# Patient Record
Sex: FEMALE | Race: ASIAN | Employment: FULL TIME | URBAN - METROPOLITAN AREA
[De-identification: names, ages, dates, MRNs, and addresses within clinical notes are randomized per-mention and may not be internally consistent; named-entity substitution may affect disease eponyms.]

---

## 2017-02-06 ENCOUNTER — HOSPITAL ENCOUNTER (OUTPATIENT)
Dept: GENERAL RADIOLOGY | Age: 52
Discharge: HOME | End: 2017-02-06

## 2017-02-06 DIAGNOSIS — R06.00 DYSPNEA: ICD-10-CM

## 2017-02-06 PROCEDURE — 71020 XR CHEST PA LAT: CPT

## 2017-08-21 ENCOUNTER — HOSPITAL ENCOUNTER (OUTPATIENT)
Dept: ULTRASOUND IMAGING | Age: 52
Discharge: HOME | End: 2017-08-21
Attending: INTERNAL MEDICINE
Payer: COMMERCIAL

## 2017-08-21 DIAGNOSIS — R10.9 ABDOMINAL PAIN, UNSPECIFIED LOCATION: ICD-10-CM

## 2017-08-21 PROCEDURE — 76856 US EXAM PELVIC COMPLETE: CPT

## 2017-08-21 PROCEDURE — 76830 TRANSVAGINAL US NON-OB: CPT

## 2017-08-21 PROCEDURE — 76700 US EXAM ABDOM COMPLETE: CPT

## 2017-09-27 ENCOUNTER — HOSPITAL ENCOUNTER (OUTPATIENT)
Dept: MRI IMAGING | Age: 52
Discharge: HOME | End: 2017-09-27
Attending: PHYSICAL MEDICINE & REHABILITATION
Payer: COMMERCIAL

## 2017-09-27 DIAGNOSIS — M48.02 CERVICAL SPINAL STENOSIS: ICD-10-CM

## 2017-09-27 DIAGNOSIS — R26.89 BALANCE PROBLEM: ICD-10-CM

## 2017-09-27 PROCEDURE — 72141 MRI NECK SPINE W/O DYE: CPT

## 2018-09-17 ENCOUNTER — HOSPITAL ENCOUNTER (OUTPATIENT)
Dept: MAMMOGRAPHY | Age: 53
Discharge: HOME | End: 2018-09-17
Payer: COMMERCIAL

## 2018-09-17 DIAGNOSIS — Z12.39 SCREENING BREAST EXAMINATION: ICD-10-CM

## 2018-09-17 DIAGNOSIS — Z78.0 POSTMENOPAUSAL: ICD-10-CM

## 2018-09-17 PROCEDURE — 77067 SCR MAMMO BI INCL CAD: CPT

## 2018-09-17 PROCEDURE — 77080 DXA BONE DENSITY AXIAL: CPT

## 2018-12-13 PROBLEM — G31.9 NEURODEGENERATIVE DISORDER (HCC): Status: ACTIVE | Noted: 2018-12-13

## 2019-02-01 PROBLEM — B02.9 HERPES ZOSTER WITHOUT COMPLICATION: Status: ACTIVE | Noted: 2019-02-01

## 2019-02-11 ENCOUNTER — HOSPITAL ENCOUNTER (OUTPATIENT)
Dept: ULTRASOUND IMAGING | Age: 54
Discharge: HOME | End: 2019-02-11
Attending: INTERNAL MEDICINE
Payer: COMMERCIAL

## 2019-02-11 DIAGNOSIS — R10.11 RUQ PAIN: ICD-10-CM

## 2019-02-11 PROCEDURE — 76705 ECHO EXAM OF ABDOMEN: CPT

## 2019-10-15 ENCOUNTER — HOSPITAL ENCOUNTER (OUTPATIENT)
Dept: MRI IMAGING | Age: 54
Discharge: HOME | End: 2019-10-15
Attending: INTERNAL MEDICINE
Payer: COMMERCIAL

## 2019-10-15 DIAGNOSIS — I34.1 NONRHEUMATIC MITRAL VALVE PROLAPSE: ICD-10-CM

## 2019-10-15 PROCEDURE — 75557 CARDIAC MRI FOR MORPH: CPT

## 2019-10-26 ENCOUNTER — HOSPITAL ENCOUNTER (OUTPATIENT)
Dept: GENERAL RADIOLOGY | Age: 54
Discharge: HOME | End: 2019-10-26
Payer: COMMERCIAL

## 2019-10-26 DIAGNOSIS — R29.818 TRANSIENT PARALYSIS OF LIMB: ICD-10-CM

## 2019-10-26 DIAGNOSIS — H57.9 GRITTY EYE: ICD-10-CM

## 2019-10-26 PROCEDURE — 72081 X-RAY EXAM ENTIRE SPI 1 VW: CPT

## 2019-10-26 PROCEDURE — 72050 X-RAY EXAM NECK SPINE 4/5VWS: CPT

## 2019-10-26 PROCEDURE — 72070 X-RAY EXAM THORAC SPINE 2VWS: CPT

## 2020-08-23 ENCOUNTER — HOSPITAL ENCOUNTER (OUTPATIENT)
Dept: GENERAL RADIOLOGY | Age: 55
Discharge: HOME | End: 2020-08-23
Attending: INTERNAL MEDICINE
Payer: COMMERCIAL

## 2020-08-23 ENCOUNTER — HOSPITAL ENCOUNTER (OUTPATIENT)
Dept: GENERAL RADIOLOGY | Age: 55
Discharge: HOME | End: 2020-08-23
Payer: COMMERCIAL

## 2020-08-23 DIAGNOSIS — Z01.89 ENCOUNTER FOR ROUTINE CHEST X-RAY: ICD-10-CM

## 2020-08-23 PROCEDURE — 71046 X-RAY EXAM CHEST 2 VIEWS: CPT

## 2021-08-26 ENCOUNTER — TRANSCRIBE ORDER (OUTPATIENT)
Dept: SCHEDULING | Age: 56
End: 2021-08-26

## 2021-08-26 DIAGNOSIS — M85.80 OTHER SPECIFIED DISORDERS OF BONE DENSITY AND STRUCTURE, UNSPECIFIED SITE: Primary | ICD-10-CM

## 2021-08-26 DIAGNOSIS — Z12.31 ENCOUNTER FOR SCREENING MAMMOGRAM FOR MALIGNANT NEOPLASM OF BREAST: Primary | ICD-10-CM

## 2021-09-14 ENCOUNTER — TRANSCRIBE ORDER (OUTPATIENT)
Dept: SCHEDULING | Age: 56
End: 2021-09-14

## 2021-09-14 DIAGNOSIS — R59.0 LYMPHADENOPATHY, AXILLARY: Primary | ICD-10-CM

## 2021-09-16 ENCOUNTER — HOSPITAL ENCOUNTER (OUTPATIENT)
Dept: ULTRASOUND IMAGING | Age: 56
Discharge: HOME | End: 2021-09-16
Attending: INTERNAL MEDICINE

## 2021-09-16 ENCOUNTER — HOSPITAL ENCOUNTER (OUTPATIENT)
Dept: MAMMOGRAPHY | Age: 56
Discharge: HOME | End: 2021-09-16
Attending: INTERNAL MEDICINE

## 2021-09-16 DIAGNOSIS — R59.0 LYMPHADENOPATHY, AXILLARY: ICD-10-CM

## 2021-09-22 ENCOUNTER — HOSPITAL ENCOUNTER (OUTPATIENT)
Dept: MAMMOGRAPHY | Age: 56
Discharge: HOME | End: 2021-09-22
Attending: INTERNAL MEDICINE
Payer: COMMERCIAL

## 2021-09-22 ENCOUNTER — HOSPITAL ENCOUNTER (OUTPATIENT)
Dept: ULTRASOUND IMAGING | Age: 56
Discharge: HOME | End: 2021-09-22
Attending: INTERNAL MEDICINE
Payer: COMMERCIAL

## 2021-09-22 DIAGNOSIS — R59.0 LYMPHADENOPATHY, AXILLARY: ICD-10-CM

## 2021-09-22 PROCEDURE — 77062 BREAST TOMOSYNTHESIS BI: CPT

## 2021-09-22 PROCEDURE — 76882 US LMTD JT/FCL EVL NVASC XTR: CPT

## 2022-03-18 PROBLEM — B02.9 HERPES ZOSTER WITHOUT COMPLICATION: Status: ACTIVE | Noted: 2019-02-01

## 2022-03-18 PROBLEM — G31.9 NEURODEGENERATIVE DISORDER (HCC): Status: ACTIVE | Noted: 2018-12-13

## 2022-11-03 ENCOUNTER — TRANSCRIBE ORDER (OUTPATIENT)
Dept: GENERAL RADIOLOGY | Age: 57
End: 2022-11-03

## 2022-11-03 DIAGNOSIS — R04.2 HEMOPTYSIS: Primary | ICD-10-CM

## 2022-11-07 ENCOUNTER — HOSPITAL ENCOUNTER (OUTPATIENT)
Dept: GENERAL RADIOLOGY | Age: 57
Discharge: HOME | End: 2022-11-07
Attending: INTERNAL MEDICINE
Payer: COMMERCIAL

## 2022-11-07 ENCOUNTER — TRANSCRIBE ORDER (OUTPATIENT)
Dept: SCHEDULING | Age: 57
End: 2022-11-07

## 2022-11-07 ENCOUNTER — TRANSCRIBE ORDER (OUTPATIENT)
Dept: GENERAL RADIOLOGY | Age: 57
End: 2022-11-07

## 2022-11-07 DIAGNOSIS — R04.2 HEMOPTYSIS: ICD-10-CM

## 2022-11-07 DIAGNOSIS — R04.2 COUGHING UP BLOOD: Primary | ICD-10-CM

## 2022-11-07 DIAGNOSIS — R04.2 HEMOPTYSIS: Primary | ICD-10-CM

## 2022-11-07 PROCEDURE — 71046 X-RAY EXAM CHEST 2 VIEWS: CPT

## 2022-11-08 ENCOUNTER — HOSPITAL ENCOUNTER (OUTPATIENT)
Dept: CT IMAGING | Age: 57
Discharge: HOME | End: 2022-11-08
Attending: INTERNAL MEDICINE
Payer: COMMERCIAL

## 2022-11-08 DIAGNOSIS — R04.2 HEMOPTYSIS: ICD-10-CM

## 2022-11-08 PROCEDURE — 74011000636 HC RX REV CODE- 636: Performed by: INTERNAL MEDICINE

## 2022-11-08 PROCEDURE — 71275 CT ANGIOGRAPHY CHEST: CPT

## 2022-11-08 RX ADMIN — IOPAMIDOL 100 ML: 755 INJECTION, SOLUTION INTRAVENOUS at 14:45

## 2022-11-09 ENCOUNTER — TRANSCRIBE ORDER (OUTPATIENT)
Dept: SCHEDULING | Age: 57
End: 2022-11-09

## 2022-11-09 DIAGNOSIS — R91.8 OTHER NONSPECIFIC ABNORMAL FINDING OF LUNG FIELD: Primary | ICD-10-CM

## 2022-12-02 ENCOUNTER — HOSPITAL ENCOUNTER (OUTPATIENT)
Dept: PET IMAGING | Age: 57
Discharge: HOME | End: 2022-12-02
Attending: INTERNAL MEDICINE
Payer: COMMERCIAL

## 2022-12-02 ENCOUNTER — HOSPITAL ENCOUNTER (OUTPATIENT)
Dept: PET IMAGING | Age: 57
End: 2022-12-02
Attending: INTERNAL MEDICINE
Payer: COMMERCIAL

## 2022-12-02 VITALS — HEIGHT: 60 IN | BODY MASS INDEX: 21.6 KG/M2 | WEIGHT: 110 LBS

## 2022-12-02 DIAGNOSIS — R91.8 OTHER NONSPECIFIC ABNORMAL FINDING OF LUNG FIELD: ICD-10-CM

## 2022-12-02 LAB
GLUCOSE BLD STRIP.AUTO-MCNC: 96 MG/DL (ref 65–117)
SERVICE CMNT-IMP: NORMAL

## 2022-12-02 PROCEDURE — A9552 F18 FDG: HCPCS

## 2022-12-02 RX ORDER — FLUDEOXYGLUCOSE F-18 200 MCI/ML
10 INJECTION INTRAVENOUS ONCE
Status: COMPLETED | OUTPATIENT
Start: 2022-12-02 | End: 2022-12-02

## 2022-12-02 RX ADMIN — FLUDEOXYGLUCOSE F-18 10.4 MILLICURIE: 200 INJECTION INTRAVENOUS at 08:00

## 2023-04-03 ENCOUNTER — TRANSCRIBE ORDER (OUTPATIENT)
Dept: SCHEDULING | Age: 58
End: 2023-04-03

## 2023-04-03 DIAGNOSIS — R93.89 ABNORMAL FINDINGS ON DIAGNOSTIC IMAGING OF OTHER SPECIFIED BODY STRUCTURES: Primary | ICD-10-CM

## 2023-04-22 DIAGNOSIS — R04.2 HEMOPTYSIS: Primary | ICD-10-CM

## 2023-08-17 ENCOUNTER — ANESTHESIA (OUTPATIENT)
Facility: HOSPITAL | Age: 58
End: 2023-08-17

## 2023-08-17 ENCOUNTER — HOSPITAL ENCOUNTER (OUTPATIENT)
Facility: HOSPITAL | Age: 58
Discharge: HOME | End: 2023-08-19
Attending: INTERNAL MEDICINE
Payer: COMMERCIAL

## 2023-08-17 ENCOUNTER — ANESTHESIA EVENT (OUTPATIENT)
Facility: HOSPITAL | Age: 58
End: 2023-08-17

## 2023-08-17 VITALS
HEIGHT: 60 IN | BODY MASS INDEX: 20.6 KG/M2 | SYSTOLIC BLOOD PRESSURE: 115 MMHG | RESPIRATION RATE: 16 BRPM | WEIGHT: 104.94 LBS | HEART RATE: 68 BPM | DIASTOLIC BLOOD PRESSURE: 76 MMHG | OXYGEN SATURATION: 98 %

## 2023-08-17 DIAGNOSIS — I05.2 MITRAL VALVE STENOSIS AND REGURGITATION: ICD-10-CM

## 2023-08-17 LAB
ECHO BSA: 1.42 M2
ECHO MV REGURGITANT ALIASING (NYQUIST) VELOCITY: 46 CM/S
ECHO MV REGURGITANT VELOCITY PISA: 3.5 M/S
ECHO MV REGURGITANT VTIA: 66 CM

## 2023-08-17 PROCEDURE — 2580000003 HC RX 258

## 2023-08-17 PROCEDURE — 93320 DOPPLER ECHO COMPLETE: CPT

## 2023-08-17 PROCEDURE — 6370000000 HC RX 637 (ALT 250 FOR IP): Performed by: INTERNAL MEDICINE

## 2023-08-17 PROCEDURE — 99153 MOD SED SAME PHYS/QHP EA: CPT

## 2023-08-17 PROCEDURE — 99152 MOD SED SAME PHYS/QHP 5/>YRS: CPT

## 2023-08-17 PROCEDURE — 6360000002 HC RX W HCPCS

## 2023-08-17 RX ORDER — LEFLUNOMIDE 10 MG/1
10 TABLET ORAL DAILY
COMMUNITY

## 2023-08-17 RX ORDER — SPIRONOLACTONE 25 MG/1
25 TABLET ORAL DAILY
COMMUNITY

## 2023-08-17 RX ORDER — FEXOFENADINE HCL 180 MG/1
180 TABLET ORAL DAILY
COMMUNITY

## 2023-08-17 RX ORDER — APREMILAST 30 MG/1
TABLET, FILM COATED ORAL 2 TIMES DAILY
COMMUNITY

## 2023-08-17 RX ORDER — LIDOCAINE HYDROCHLORIDE 20 MG/ML
SOLUTION OROPHARYNGEAL PRN
Status: COMPLETED | OUTPATIENT
Start: 2023-08-17 | End: 2023-08-17

## 2023-08-17 RX ORDER — SODIUM CHLORIDE 9 MG/ML
INJECTION, SOLUTION INTRAVENOUS CONTINUOUS PRN
Status: DISCONTINUED | OUTPATIENT
Start: 2023-08-17 | End: 2023-08-17 | Stop reason: SDUPTHER

## 2023-08-17 RX ORDER — FOLIC ACID 1 MG/1
1 TABLET ORAL DAILY
COMMUNITY

## 2023-08-17 RX ADMIN — PROPOFOL 20 MG: 10 INJECTION, EMULSION INTRAVENOUS at 11:28

## 2023-08-17 RX ADMIN — SODIUM CHLORIDE: 9 INJECTION, SOLUTION INTRAVENOUS at 11:18

## 2023-08-17 RX ADMIN — PROPOFOL 80 MG: 10 INJECTION, EMULSION INTRAVENOUS at 11:27

## 2023-08-17 RX ADMIN — LIDOCAINE HYDROCHLORIDE 10 ML: 20 SOLUTION ORAL at 11:14

## 2023-08-17 RX ADMIN — PROPOFOL 100 MG: 10 INJECTION, EMULSION INTRAVENOUS at 11:56

## 2023-08-17 RX ADMIN — BENZOCAINE, BUTAMBEN, AND TETRACAINE HYDROCHLORIDE 1 SPRAY: .028; .004; .004 AEROSOL, SPRAY TOPICAL at 11:15

## 2023-08-17 ASSESSMENT — ENCOUNTER SYMPTOMS: SHORTNESS OF BREATH: 1

## 2023-08-17 NOTE — PROGRESS NOTES
Patient arrived to Non-Invasive Cardiology Lab for Out Patient MELODY Procedure. Staff introduced to patient. Patient identifiers verified with Name and Date of Birth. Procedure verified with patient. Consent forms reviewed and signed by patient or authorized representative and verified. Allergies verified. Patient informed of procedure and plan of care. Questions answered with review. Patient on cardiac monitor, non-invasive blood pressure, SPO2 monitor. On RA. Patient is A&Ox3. Patient reports no complaints. Patient on stretcher, in low position, with side rails up. Patient instructed to call for assistance as needed. Family in waiting room.

## 2023-08-17 NOTE — DISCHARGE INSTRUCTIONS
You had a MELODY today and your throat was numbed. It is normal for your throat to be sore for a day. Do not eat or drink anything today until 1:15p or later. You had anesthesia today. Do not drive today. It is normal to feel tired so get plenty of rest today. Be careful when standing up that you are not light headed before you start walking. Take all medications as directed.

## 2023-08-17 NOTE — ANESTHESIA POSTPROCEDURE EVALUATION
Department of Anesthesiology  Postprocedure Note    Patient:  Kimberly Juárez  MRN: [id number]  YOB: 1965  Date of evaluation: 8/17/2023      Procedure Summary     Date: 08/17/23 Room / Location: [facility] NON_INVASIVE CARDIOLOGY    Anesthesia Start: 1118 Anesthesia Stop: 1155    Procedure: RAD TRANSESOPHAGEAL ECHO Diagnosis:     Scheduled Providers:  Responsible Provider: Yoselin Newell DO    Anesthesia Type: MAC ASA Status: 3          Anesthesia Type: MAC    David Phase I:      David Phase II:        Anesthesia Post Evaluation    Patient location during evaluation: PACU  Patient participation: complete - patient participated  Level of consciousness: awake  Pain score: 0  Airway patency: patent  Nausea & Vomiting: no nausea  Complications: no  Cardiovascular status: hemodynamically stable  Respiratory status: acceptable  Hydration status: euvolemic  Comments: Seen, no complaints   Pain management: adequate

## 2023-08-17 NOTE — PROGRESS NOTES
Patient returned to baseline activity level. Discharge instructions reviewed and all questions and concerns were answered. Saline locked IV removed without complication. Patient wheeled to hospital entrance where her  was waiting to drive home.

## 2024-02-29 ENCOUNTER — HOSPITAL ENCOUNTER (OUTPATIENT)
Age: 59
Discharge: HOME | End: 2024-02-29
Payer: COMMERCIAL

## 2024-02-29 DIAGNOSIS — R05.9 COUGH, UNSPECIFIED TYPE: ICD-10-CM

## 2024-02-29 PROCEDURE — 71046 X-RAY EXAM CHEST 2 VIEWS: CPT

## 2024-03-11 ENCOUNTER — TRANSCRIBE ORDERS (OUTPATIENT)
Facility: HOSPITAL | Age: 59
End: 2024-03-11

## 2024-03-11 DIAGNOSIS — Z95.4 S/P MITRAL VALVE ALLOGRAFT: Primary | ICD-10-CM

## 2024-03-28 ENCOUNTER — HOSPITAL ENCOUNTER (OUTPATIENT)
Facility: HOSPITAL | Age: 59
Setting detail: RECURRING SERIES
Discharge: HOME | End: 2024-03-31
Attending: INTERNAL MEDICINE
Payer: COMMERCIAL

## 2024-03-28 VITALS — HEIGHT: 60 IN | WEIGHT: 107.4 LBS | BODY MASS INDEX: 21.09 KG/M2

## 2024-03-28 PROCEDURE — 93798 PHYS/QHP OP CAR RHAB W/ECG: CPT

## 2024-03-28 PROCEDURE — 93797 PHYS/QHP OP CAR RHAB WO ECG: CPT

## 2024-03-28 ASSESSMENT — EXERCISE STRESS TEST
PEAK_HR: 98
PEAK_RPE: 11
PEAK_METS: 1.8
PEAK_HR: 98
PEAK_BP: 140/90
PEAK_RPE: 11
PEAK_BP: 140/90
PEAK_BP: 140/90

## 2024-03-28 ASSESSMENT — EJECTION FRACTION
EF_VALUE: 60
EF_VALUE: 60

## 2024-03-28 ASSESSMENT — PATIENT HEALTH QUESTIONNAIRE - PHQ9
SUM OF ALL RESPONSES TO PHQ QUESTIONS 1-9: 1
SUM OF ALL RESPONSES TO PHQ QUESTIONS 1-9: 1
3. TROUBLE FALLING OR STAYING ASLEEP: NOT AT ALL
10. IF YOU CHECKED OFF ANY PROBLEMS, HOW DIFFICULT HAVE THESE PROBLEMS MADE IT FOR YOU TO DO YOUR WORK, TAKE CARE OF THINGS AT HOME, OR GET ALONG WITH OTHER PEOPLE: NOT DIFFICULT AT ALL
7. TROUBLE CONCENTRATING ON THINGS, SUCH AS READING THE NEWSPAPER OR WATCHING TELEVISION: NOT AT ALL
SUM OF ALL RESPONSES TO PHQ QUESTIONS 1-9: 1
2. FEELING DOWN, DEPRESSED OR HOPELESS: NOT AT ALL
6. FEELING BAD ABOUT YOURSELF - OR THAT YOU ARE A FAILURE OR HAVE LET YOURSELF OR YOUR FAMILY DOWN: NOT AT ALL
SUM OF ALL RESPONSES TO PHQ QUESTIONS 1-9: 1
SUM OF ALL RESPONSES TO PHQ9 QUESTIONS 1 & 2: 0
5. POOR APPETITE OR OVEREATING: NOT AT ALL
1. LITTLE INTEREST OR PLEASURE IN DOING THINGS: NOT AT ALL
4. FEELING TIRED OR HAVING LITTLE ENERGY: SEVERAL DAYS
9. THOUGHTS THAT YOU WOULD BE BETTER OFF DEAD, OR OF HURTING YOURSELF: NOT AT ALL
8. MOVING OR SPEAKING SO SLOWLY THAT OTHER PEOPLE COULD HAVE NOTICED. OR THE OPPOSITE, BEING SO FIGETY OR RESTLESS THAT YOU HAVE BEEN MOVING AROUND A LOT MORE THAN USUAL: NOT AT ALL

## 2024-03-28 ASSESSMENT — LIFESTYLE VARIABLES: SMOKELESS_TOBACCO: NO

## 2024-03-28 NOTE — CARDIO/PULMONARY
INTAKE APPOINTMENT NOTE  3/28/2024    NAME: Christos Awan : 1965 AGE: 58 y.o.  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]    REFERRING PHYSICIAN: Maikel Juarez MD    MEDICAL HX:  Past Medical History:   Diagnosis Date    Arrhythmia 2012    Eczema 3/16/2012    Hypertension 3/16/2012    Menopause 2015    Pituitary adenoma (HCC) 2012    Viral meningitis 3/16/2012       LABS:     No results found for: \"HBA1C\", \"[id number]\"  Lab Results   Component Value Date/Time    CHOL 220 2020 08:51 AM    HDL 97 2020 08:51 AM         ANTHROPOMETRICS:      Ht Readings from Last 1 Encounters:   24 1.524 m (5')      Wt Readings from Last 1 Encounters:   24 48.7 kg (107 lb 6.4 oz)        WAIST: 27       VISIT SUMMARY:    Christos Awan 58 y.o. 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 EF is 60 % Cardiac risk factors include hypertension, inactivity .      Christos Awan lives with her .  She is the  at the [employer].     Patient was evaluated for depression using the PHQ-9 assessment tool with a result of 1 which is considered mild.     Patient denied chest pain or SOB during 6 minute walk test/ETT and was in SR occ. PACs. Patient walked 0.1 mi at a speed of 1 mph and grade of 0 % for a final MET level of 1.8 mets. Exercise prescription developed using exercise tolerance results and patient stated goals, to be supplemented with home exercise recommendations. Education manual given to patient and reviewed. Patient will attend cardiac rehab 2 times/week which will include both exercise and education sessions.    Intake Start Time: 1400  Intake End Time: 1540    CHRISTOS CHRISTIANSON RN

## 2024-04-02 ENCOUNTER — HOSPITAL ENCOUNTER (OUTPATIENT)
Facility: HOSPITAL | Age: 59
Setting detail: RECURRING SERIES
Discharge: HOME | End: 2024-04-05
Attending: INTERNAL MEDICINE
Payer: COMMERCIAL

## 2024-04-02 VITALS — BODY MASS INDEX: 20.86 KG/M2 | WEIGHT: 106.8 LBS

## 2024-04-02 PROCEDURE — 93798 PHYS/QHP OP CAR RHAB W/ECG: CPT

## 2024-04-02 PROCEDURE — 93797 PHYS/QHP OP CAR RHAB WO ECG: CPT

## 2024-04-02 ASSESSMENT — EXERCISE STRESS TEST
PEAK_METS: 1.8
PEAK_METS: 1.8

## 2024-04-02 NOTE — CARDIO/PULMONARY
plant-based).    Briefly reviewed the nutrition information in the Cardiac Rehab patient education book and encouraged patient to read thoroughly, ask questions as needed, and use for future reference for heart healthy nutrition information.    Supplemental handouts provided: None today    Aracelis Awan is not scheduled to participate in Cardiac Rehab group nutrition classes.          PATIENT GOALS:    Weight Goals:Maintain    Nutrition Goals:  Daily Recommendations:  Calories: 1300 /day  (for weight maintenance)  Saturated Fat: no more than 8 g/day  Trans Fat: 0 g/day  Sodium: no more than 2300 mg/day  Fruit: 1 cups / day  Vegetables: 2 cups/day    Other:  - read and compare food labels  - Fill 1/2 of your plate with nonstarchy vegetables daily at dinner this month.      Keeping a food diary was recommended.            Questions addressed. Follow-up plans discussed. Patient verbalized understanding.    Claudia Thornton RD, [facility]– [place]

## 2024-04-04 ENCOUNTER — HOSPITAL ENCOUNTER (OUTPATIENT)
Facility: HOSPITAL | Age: 59
Setting detail: RECURRING SERIES
Discharge: HOME | End: 2024-04-07
Attending: INTERNAL MEDICINE
Payer: COMMERCIAL

## 2024-04-04 VITALS — BODY MASS INDEX: 21.09 KG/M2 | WEIGHT: 108 LBS

## 2024-04-04 PROCEDURE — 93798 PHYS/QHP OP CAR RHAB W/ECG: CPT

## 2024-04-04 ASSESSMENT — EXERCISE STRESS TEST
PEAK_RPE: 11
PEAK_METS: 2.6

## 2024-04-09 ENCOUNTER — HOSPITAL ENCOUNTER (OUTPATIENT)
Facility: HOSPITAL | Age: 59
Setting detail: RECURRING SERIES
Discharge: HOME | End: 2024-04-12
Attending: INTERNAL MEDICINE
Payer: COMMERCIAL

## 2024-04-09 VITALS — WEIGHT: 106.4 LBS | BODY MASS INDEX: 20.78 KG/M2

## 2024-04-09 PROCEDURE — 93798 PHYS/QHP OP CAR RHAB W/ECG: CPT

## 2024-04-09 ASSESSMENT — EXERCISE STRESS TEST
PEAK_METS: 2.6
PEAK_RPE: 12

## 2024-04-11 ENCOUNTER — HOSPITAL ENCOUNTER (OUTPATIENT)
Facility: HOSPITAL | Age: 59
Setting detail: RECURRING SERIES
Discharge: HOME | End: 2024-04-14
Attending: INTERNAL MEDICINE
Payer: COMMERCIAL

## 2024-04-11 VITALS — WEIGHT: 106.4 LBS | BODY MASS INDEX: 20.78 KG/M2

## 2024-04-11 PROCEDURE — 93798 PHYS/QHP OP CAR RHAB W/ECG: CPT

## 2024-04-11 ASSESSMENT — EXERCISE STRESS TEST
PEAK_RPE: 12
PEAK_METS: 2.8

## 2024-04-16 ENCOUNTER — TRANSCRIBE ORDERS (OUTPATIENT)
Age: 59
End: 2024-04-16

## 2024-04-16 ENCOUNTER — HOSPITAL ENCOUNTER (OUTPATIENT)
Facility: HOSPITAL | Age: 59
Setting detail: RECURRING SERIES
Discharge: HOME | End: 2024-04-19
Attending: INTERNAL MEDICINE
Payer: COMMERCIAL

## 2024-04-16 ENCOUNTER — HOSPITAL ENCOUNTER (OUTPATIENT)
Age: 59
Discharge: HOME | End: 2024-04-19
Payer: COMMERCIAL

## 2024-04-16 VITALS — WEIGHT: 107.6 LBS | BODY MASS INDEX: 21.01 KG/M2

## 2024-04-16 DIAGNOSIS — J93.9 PNEUMOTHORAX ON RIGHT: ICD-10-CM

## 2024-04-16 DIAGNOSIS — J93.83 OTHER PNEUMOTHORAX: ICD-10-CM

## 2024-04-16 DIAGNOSIS — J93.83 OTHER PNEUMOTHORAX: Primary | ICD-10-CM

## 2024-04-16 PROCEDURE — 93798 PHYS/QHP OP CAR RHAB W/ECG: CPT

## 2024-04-16 PROCEDURE — 71046 X-RAY EXAM CHEST 2 VIEWS: CPT

## 2024-04-16 ASSESSMENT — EXERCISE STRESS TEST
PEAK_RPE: 12
PEAK_METS: 2.8

## 2024-04-18 ENCOUNTER — HOSPITAL ENCOUNTER (OUTPATIENT)
Facility: HOSPITAL | Age: 59
Setting detail: RECURRING SERIES
Discharge: HOME | End: 2024-04-21
Attending: INTERNAL MEDICINE
Payer: COMMERCIAL

## 2024-04-18 VITALS — BODY MASS INDEX: 21.01 KG/M2 | WEIGHT: 107.6 LBS

## 2024-04-18 PROCEDURE — 93798 PHYS/QHP OP CAR RHAB W/ECG: CPT

## 2024-04-18 RX ORDER — FUROSEMIDE 20 MG/1
20 TABLET ORAL DAILY
COMMUNITY

## 2024-04-18 ASSESSMENT — LIFESTYLE VARIABLES: SMOKELESS_TOBACCO: NO

## 2024-04-18 ASSESSMENT — EJECTION FRACTION: EF_VALUE: 60

## 2024-04-18 ASSESSMENT — EXERCISE STRESS TEST
PEAK_RPE: 12
PEAK_METS: 2.9

## 2024-04-23 ENCOUNTER — HOSPITAL ENCOUNTER (OUTPATIENT)
Facility: HOSPITAL | Age: 59
Setting detail: RECURRING SERIES
Discharge: HOME | End: 2024-04-26
Attending: INTERNAL MEDICINE
Payer: COMMERCIAL

## 2024-04-23 VITALS — BODY MASS INDEX: 21.09 KG/M2 | WEIGHT: 108 LBS

## 2024-04-23 PROCEDURE — 93798 PHYS/QHP OP CAR RHAB W/ECG: CPT

## 2024-04-23 ASSESSMENT — EXERCISE STRESS TEST
PEAK_METS: 2.9
PEAK_RPE: 12

## 2024-04-25 ENCOUNTER — HOSPITAL ENCOUNTER (OUTPATIENT)
Facility: HOSPITAL | Age: 59
Setting detail: RECURRING SERIES
Discharge: HOME | End: 2024-04-28
Attending: INTERNAL MEDICINE
Payer: COMMERCIAL

## 2024-04-25 VITALS — BODY MASS INDEX: 20.9 KG/M2 | WEIGHT: 107 LBS

## 2024-04-25 PROCEDURE — 93798 PHYS/QHP OP CAR RHAB W/ECG: CPT

## 2024-04-25 ASSESSMENT — EXERCISE STRESS TEST
PEAK_METS: 3
PEAK_RPE: 12

## 2024-04-30 ENCOUNTER — HOSPITAL ENCOUNTER (OUTPATIENT)
Facility: HOSPITAL | Age: 59
Setting detail: RECURRING SERIES
Discharge: HOME | End: 2024-05-03
Attending: INTERNAL MEDICINE
Payer: COMMERCIAL

## 2024-04-30 VITALS — WEIGHT: 108 LBS | BODY MASS INDEX: 21.09 KG/M2

## 2024-04-30 PROCEDURE — 93798 PHYS/QHP OP CAR RHAB W/ECG: CPT

## 2024-04-30 ASSESSMENT — EXERCISE STRESS TEST
PEAK_METS: 3
PEAK_RPE: 12

## 2024-05-02 ENCOUNTER — HOSPITAL ENCOUNTER (OUTPATIENT)
Facility: HOSPITAL | Age: 59
Setting detail: RECURRING SERIES
Discharge: HOME | End: 2024-05-05
Attending: INTERNAL MEDICINE
Payer: COMMERCIAL

## 2024-05-02 VITALS — WEIGHT: 109 LBS | BODY MASS INDEX: 21.29 KG/M2

## 2024-05-02 PROCEDURE — 93798 PHYS/QHP OP CAR RHAB W/ECG: CPT

## 2024-05-02 ASSESSMENT — EXERCISE STRESS TEST
PEAK_RPE: 12
PEAK_METS: 4.1

## 2024-05-07 ENCOUNTER — HOSPITAL ENCOUNTER (OUTPATIENT)
Facility: HOSPITAL | Age: 59
Setting detail: RECURRING SERIES
Discharge: HOME | End: 2024-05-10
Attending: INTERNAL MEDICINE
Payer: COMMERCIAL

## 2024-05-07 VITALS — WEIGHT: 108 LBS | BODY MASS INDEX: 21.09 KG/M2

## 2024-05-07 PROCEDURE — 93798 PHYS/QHP OP CAR RHAB W/ECG: CPT

## 2024-05-07 RX ORDER — METOPROLOL SUCCINATE 25 MG/1
25 TABLET, EXTENDED RELEASE ORAL DAILY
COMMUNITY

## 2024-05-07 ASSESSMENT — EXERCISE STRESS TEST
PEAK_RPE: 12
PEAK_METS: 4.1

## 2024-05-09 ENCOUNTER — HOSPITAL ENCOUNTER (OUTPATIENT)
Facility: HOSPITAL | Age: 59
Setting detail: RECURRING SERIES
Discharge: HOME | End: 2024-05-12
Attending: INTERNAL MEDICINE
Payer: COMMERCIAL

## 2024-05-09 VITALS — WEIGHT: 106.8 LBS | BODY MASS INDEX: 20.86 KG/M2

## 2024-05-09 PROCEDURE — 93798 PHYS/QHP OP CAR RHAB W/ECG: CPT

## 2024-05-09 ASSESSMENT — EXERCISE STRESS TEST
PEAK_RPE: 12
PEAK_METS: 4.1

## 2024-05-09 ASSESSMENT — EJECTION FRACTION: EF_VALUE: 60

## 2024-05-09 ASSESSMENT — LIFESTYLE VARIABLES: SMOKELESS_TOBACCO: NO

## 2024-05-14 ENCOUNTER — HOSPITAL ENCOUNTER (OUTPATIENT)
Facility: HOSPITAL | Age: 59
Setting detail: RECURRING SERIES
Discharge: HOME | End: 2024-05-17
Attending: INTERNAL MEDICINE
Payer: COMMERCIAL

## 2024-05-14 VITALS — WEIGHT: 107.2 LBS | BODY MASS INDEX: 20.94 KG/M2

## 2024-05-14 PROCEDURE — 93798 PHYS/QHP OP CAR RHAB W/ECG: CPT

## 2024-05-14 ASSESSMENT — EXERCISE STRESS TEST
PEAK_RPE: 12
PEAK_METS: 4.1

## 2024-05-16 ENCOUNTER — HOSPITAL ENCOUNTER (OUTPATIENT)
Facility: HOSPITAL | Age: 59
Setting detail: RECURRING SERIES
Discharge: HOME | End: 2024-05-19
Attending: INTERNAL MEDICINE
Payer: COMMERCIAL

## 2024-05-16 VITALS — BODY MASS INDEX: 20.94 KG/M2 | WEIGHT: 107.2 LBS

## 2024-05-16 PROCEDURE — 93798 PHYS/QHP OP CAR RHAB W/ECG: CPT

## 2024-05-16 ASSESSMENT — EXERCISE STRESS TEST
PEAK_METS: 4.1
PEAK_RPE: 12

## 2024-05-21 ENCOUNTER — HOSPITAL ENCOUNTER (OUTPATIENT)
Facility: HOSPITAL | Age: 59
Setting detail: RECURRING SERIES
Discharge: HOME | End: 2024-05-24
Attending: INTERNAL MEDICINE
Payer: COMMERCIAL

## 2024-05-21 VITALS — BODY MASS INDEX: 21.05 KG/M2 | WEIGHT: 107.8 LBS

## 2024-05-21 PROCEDURE — 93798 PHYS/QHP OP CAR RHAB W/ECG: CPT

## 2024-05-21 ASSESSMENT — EXERCISE STRESS TEST
PEAK_RPE: 12
PEAK_METS: 4.1

## 2024-05-23 ENCOUNTER — HOSPITAL ENCOUNTER (OUTPATIENT)
Facility: HOSPITAL | Age: 59
Setting detail: RECURRING SERIES
Discharge: HOME | End: 2024-05-26
Attending: INTERNAL MEDICINE
Payer: COMMERCIAL

## 2024-05-23 VITALS — WEIGHT: 107.8 LBS | BODY MASS INDEX: 21.05 KG/M2

## 2024-05-23 PROCEDURE — 93798 PHYS/QHP OP CAR RHAB W/ECG: CPT

## 2024-05-23 ASSESSMENT — EXERCISE STRESS TEST
PEAK_METS: 4.1
PEAK_RPE: 12

## 2024-05-28 ENCOUNTER — HOSPITAL ENCOUNTER (OUTPATIENT)
Facility: HOSPITAL | Age: 59
Setting detail: RECURRING SERIES
Discharge: HOME | End: 2024-05-31
Attending: INTERNAL MEDICINE
Payer: COMMERCIAL

## 2024-05-28 VITALS — WEIGHT: 107.6 LBS | BODY MASS INDEX: 21.01 KG/M2

## 2024-05-28 PROCEDURE — 93798 PHYS/QHP OP CAR RHAB W/ECG: CPT

## 2024-05-28 ASSESSMENT — EXERCISE STRESS TEST
PEAK_RPE: 12
PEAK_METS: 4.1

## 2024-05-30 ENCOUNTER — HOSPITAL ENCOUNTER (OUTPATIENT)
Facility: HOSPITAL | Age: 59
Setting detail: RECURRING SERIES
End: 2024-05-30
Attending: INTERNAL MEDICINE
Payer: COMMERCIAL

## 2024-05-30 VITALS — BODY MASS INDEX: 21.33 KG/M2 | WEIGHT: 109.2 LBS

## 2024-05-30 PROCEDURE — 93798 PHYS/QHP OP CAR RHAB W/ECG: CPT

## 2024-05-30 ASSESSMENT — LIFESTYLE VARIABLES: SMOKELESS_TOBACCO: NO

## 2024-05-30 ASSESSMENT — EXERCISE STRESS TEST
PEAK_RPE: 12
PEAK_METS: 4.9

## 2024-05-30 ASSESSMENT — EJECTION FRACTION: EF_VALUE: 60

## 2024-06-04 ENCOUNTER — HOSPITAL ENCOUNTER (OUTPATIENT)
Facility: HOSPITAL | Age: 59
Setting detail: RECURRING SERIES
Discharge: HOME | End: 2024-06-07
Attending: INTERNAL MEDICINE
Payer: COMMERCIAL

## 2024-06-04 VITALS — WEIGHT: 106.2 LBS | BODY MASS INDEX: 20.74 KG/M2

## 2024-06-04 PROCEDURE — 93798 PHYS/QHP OP CAR RHAB W/ECG: CPT

## 2024-06-04 ASSESSMENT — EXERCISE STRESS TEST
PEAK_RPE: 12
PEAK_METS: 4.9

## 2024-06-06 ENCOUNTER — HOSPITAL ENCOUNTER (OUTPATIENT)
Facility: HOSPITAL | Age: 59
Setting detail: RECURRING SERIES
Discharge: HOME | End: 2024-06-09
Attending: INTERNAL MEDICINE
Payer: COMMERCIAL

## 2024-06-06 VITALS — WEIGHT: 106.6 LBS | BODY MASS INDEX: 20.82 KG/M2

## 2024-06-06 PROCEDURE — 93798 PHYS/QHP OP CAR RHAB W/ECG: CPT

## 2024-06-06 ASSESSMENT — EXERCISE STRESS TEST
PEAK_METS: 4.9
PEAK_RPE: 12

## 2024-06-11 ENCOUNTER — HOSPITAL ENCOUNTER (OUTPATIENT)
Facility: HOSPITAL | Age: 59
Setting detail: RECURRING SERIES
Discharge: HOME | End: 2024-06-14
Attending: INTERNAL MEDICINE
Payer: COMMERCIAL

## 2024-06-11 VITALS — BODY MASS INDEX: 20.94 KG/M2 | WEIGHT: 107.2 LBS

## 2024-06-11 PROCEDURE — 93798 PHYS/QHP OP CAR RHAB W/ECG: CPT

## 2024-06-11 ASSESSMENT — EXERCISE STRESS TEST
PEAK_RPE: 12
PEAK_METS: 4.9

## 2024-06-13 ENCOUNTER — HOSPITAL ENCOUNTER (OUTPATIENT)
Facility: HOSPITAL | Age: 59
Setting detail: RECURRING SERIES
Discharge: HOME | End: 2024-06-16
Attending: INTERNAL MEDICINE
Payer: COMMERCIAL

## 2024-06-13 VITALS — WEIGHT: 107.2 LBS | BODY MASS INDEX: 20.94 KG/M2

## 2024-06-13 PROCEDURE — 93798 PHYS/QHP OP CAR RHAB W/ECG: CPT

## 2024-06-13 ASSESSMENT — EXERCISE STRESS TEST
PEAK_RPE: 12
PEAK_METS: 6.7

## 2024-06-18 ENCOUNTER — HOSPITAL ENCOUNTER (OUTPATIENT)
Facility: HOSPITAL | Age: 59
Setting detail: RECURRING SERIES
Discharge: HOME | End: 2024-06-21
Attending: INTERNAL MEDICINE
Payer: COMMERCIAL

## 2024-06-18 VITALS — WEIGHT: 108 LBS | BODY MASS INDEX: 21.09 KG/M2

## 2024-06-18 PROCEDURE — 93798 PHYS/QHP OP CAR RHAB W/ECG: CPT

## 2024-06-18 ASSESSMENT — EXERCISE STRESS TEST
PEAK_METS: 6.7
PEAK_RPE: 12
PEAK_HR: 162

## 2024-06-20 ENCOUNTER — HOSPITAL ENCOUNTER (OUTPATIENT)
Facility: HOSPITAL | Age: 59
Setting detail: RECURRING SERIES
Discharge: HOME | End: 2024-06-23
Attending: INTERNAL MEDICINE
Payer: COMMERCIAL

## 2024-06-20 VITALS — BODY MASS INDEX: 21.09 KG/M2 | WEIGHT: 108 LBS

## 2024-06-20 PROCEDURE — 93798 PHYS/QHP OP CAR RHAB W/ECG: CPT

## 2024-06-20 ASSESSMENT — EXERCISE STRESS TEST
PEAK_METS: 6.7
PEAK_RPE: 12
PEAK_HR: 169

## 2024-06-20 ASSESSMENT — EJECTION FRACTION: EF_VALUE: 60

## 2024-06-20 ASSESSMENT — LIFESTYLE VARIABLES: SMOKELESS_TOBACCO: NO

## 2024-06-25 ENCOUNTER — HOSPITAL ENCOUNTER (OUTPATIENT)
Facility: HOSPITAL | Age: 59
Setting detail: RECURRING SERIES
Discharge: HOME | End: 2024-06-28
Attending: INTERNAL MEDICINE
Payer: COMMERCIAL

## 2024-06-25 VITALS — WEIGHT: 108.6 LBS | BODY MASS INDEX: 21.21 KG/M2

## 2024-06-25 PROCEDURE — 93798 PHYS/QHP OP CAR RHAB W/ECG: CPT

## 2024-06-25 ASSESSMENT — EXERCISE STRESS TEST
PEAK_RPE: 12
PEAK_METS: 6.7

## 2024-06-27 ENCOUNTER — HOSPITAL ENCOUNTER (OUTPATIENT)
Facility: HOSPITAL | Age: 59
Setting detail: RECURRING SERIES
Discharge: HOME | End: 2024-06-30
Attending: INTERNAL MEDICINE
Payer: COMMERCIAL

## 2024-06-27 VITALS — WEIGHT: 107.6 LBS | BODY MASS INDEX: 21.01 KG/M2

## 2024-06-27 PROCEDURE — 93798 PHYS/QHP OP CAR RHAB W/ECG: CPT

## 2024-06-27 ASSESSMENT — EXERCISE STRESS TEST
PEAK_RPE: 12
PEAK_METS: 6.7

## 2024-07-02 ENCOUNTER — HOSPITAL ENCOUNTER (OUTPATIENT)
Facility: HOSPITAL | Age: 59
Setting detail: RECURRING SERIES
Discharge: HOME | End: 2024-07-05
Attending: INTERNAL MEDICINE
Payer: COMMERCIAL

## 2024-07-02 VITALS — WEIGHT: 107.6 LBS | BODY MASS INDEX: 21.01 KG/M2

## 2024-07-02 PROCEDURE — 93798 PHYS/QHP OP CAR RHAB W/ECG: CPT

## 2024-07-02 ASSESSMENT — EXERCISE STRESS TEST
PEAK_METS: 6.7
PEAK_RPE: 12

## 2024-07-09 ENCOUNTER — HOSPITAL ENCOUNTER (OUTPATIENT)
Facility: HOSPITAL | Age: 59
Setting detail: RECURRING SERIES
Discharge: HOME | End: 2024-07-12
Attending: INTERNAL MEDICINE
Payer: COMMERCIAL

## 2024-07-09 VITALS — WEIGHT: 107 LBS | BODY MASS INDEX: 20.9 KG/M2

## 2024-07-09 PROCEDURE — 93798 PHYS/QHP OP CAR RHAB W/ECG: CPT

## 2024-07-09 ASSESSMENT — PATIENT HEALTH QUESTIONNAIRE - PHQ9
7. TROUBLE CONCENTRATING ON THINGS, SUCH AS READING THE NEWSPAPER OR WATCHING TELEVISION: NOT AT ALL
10. IF YOU CHECKED OFF ANY PROBLEMS, HOW DIFFICULT HAVE THESE PROBLEMS MADE IT FOR YOU TO DO YOUR WORK, TAKE CARE OF THINGS AT HOME, OR GET ALONG WITH OTHER PEOPLE: NOT DIFFICULT AT ALL
1. LITTLE INTEREST OR PLEASURE IN DOING THINGS: NOT AT ALL
5. POOR APPETITE OR OVEREATING: NOT AT ALL
3. TROUBLE FALLING OR STAYING ASLEEP: NOT AT ALL
4. FEELING TIRED OR HAVING LITTLE ENERGY: SEVERAL DAYS
SUM OF ALL RESPONSES TO PHQ QUESTIONS 1-9: 1
6. FEELING BAD ABOUT YOURSELF - OR THAT YOU ARE A FAILURE OR HAVE LET YOURSELF OR YOUR FAMILY DOWN: NOT AT ALL
9. THOUGHTS THAT YOU WOULD BE BETTER OFF DEAD, OR OF HURTING YOURSELF: NOT AT ALL
2. FEELING DOWN, DEPRESSED OR HOPELESS: NOT AT ALL
8. MOVING OR SPEAKING SO SLOWLY THAT OTHER PEOPLE COULD HAVE NOTICED. OR THE OPPOSITE, BEING SO FIGETY OR RESTLESS THAT YOU HAVE BEEN MOVING AROUND A LOT MORE THAN USUAL: NOT AT ALL
SUM OF ALL RESPONSES TO PHQ9 QUESTIONS 1 & 2: 0

## 2024-07-09 ASSESSMENT — EXERCISE STRESS TEST
PEAK_RPE: 12
PEAK_METS: 6.7

## 2024-07-11 ENCOUNTER — HOSPITAL ENCOUNTER (OUTPATIENT)
Facility: HOSPITAL | Age: 59
Setting detail: RECURRING SERIES
Discharge: HOME | End: 2024-07-14
Attending: INTERNAL MEDICINE
Payer: COMMERCIAL

## 2024-07-11 VITALS — WEIGHT: 106.8 LBS | BODY MASS INDEX: 20.86 KG/M2

## 2024-07-11 PROCEDURE — 93798 PHYS/QHP OP CAR RHAB W/ECG: CPT

## 2024-07-11 ASSESSMENT — EXERCISE STRESS TEST
PEAK_RPE: 12
PEAK_METS: 6.7

## 2024-07-16 ENCOUNTER — HOSPITAL ENCOUNTER (OUTPATIENT)
Facility: HOSPITAL | Age: 59
Setting detail: RECURRING SERIES
Discharge: HOME | End: 2024-07-19
Attending: INTERNAL MEDICINE
Payer: COMMERCIAL

## 2024-07-16 VITALS — BODY MASS INDEX: 20.94 KG/M2 | WEIGHT: 107.2 LBS

## 2024-07-16 PROCEDURE — 93798 PHYS/QHP OP CAR RHAB W/ECG: CPT

## 2024-07-16 ASSESSMENT — EXERCISE STRESS TEST
PEAK_METS: 6.7
PEAK_RPE: 12

## 2024-07-18 ENCOUNTER — HOSPITAL ENCOUNTER (OUTPATIENT)
Facility: HOSPITAL | Age: 59
Setting detail: RECURRING SERIES
Discharge: HOME | End: 2024-07-21
Attending: INTERNAL MEDICINE
Payer: COMMERCIAL

## 2024-07-18 VITALS — WEIGHT: 107.2 LBS | BODY MASS INDEX: 20.94 KG/M2

## 2024-07-18 PROCEDURE — 93798 PHYS/QHP OP CAR RHAB W/ECG: CPT

## 2024-07-18 NOTE — CARDIO/PULMONARY
DISCHARGE SUMMARY NOTE  2024      NAME: Aracelis Awan : 1965 AGE: 58 y.o.  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]    REFERRING PHYSICIAN: Maikel Juarez MD    MEDICAL HX:  Past Medical History:   Diagnosis Date    Arrhythmia 2012    Eczema 3/16/2012    Hypertension 3/16/2012    Menopause 2015    Pituitary adenoma (HCC) 2012    Viral meningitis 3/16/2012       LABS:     No results found for: \"HBA1C\", \"[id number]\"  Lab Results   Component Value Date/Time    CHOL 220 2020 08:51 AM    HDL 97 2020 08:51 AM     2020 08:51 AM    VLDL 15 2020 08:51 AM         ANTHROPOMETRICS:      Ht Readings from Last 1 Encounters:   24 1.524 m (5')      Wt Readings from Last 1 Encounters:   24 48.6 kg (107 lb 3.2 oz)        WAIST: 26.5         PROGRAM SUMMARY:    Aracelis Awan completed 36 sessions in phase II of the Cardiac Rehab program. Patient walked   mi at a speed of   and grade of   for a final MET level of 6.7. Aracelis Awan plans to continue exercising at home and/or a gym and has set revised goals that include 30 minutes of moderate-intensity aerobic activity 3-5 days weekly.      MET level increased from 1.8 at intake to 4.9 at discharge. Weight lost was .2 lbs. and .5 inches from their waist.  Aracelis Awan will focus on diet and nutrition at home and has received individualized healthy nutrition recommendations from [facility] Cardiac Rehab staff.  They are aware of their cardiac disease risk factors and cardiac medications. All questions were addressed and discharge plans discussed. Aracelis Awan verbalized understanding.      MAGO Parks  2024

## 2024-07-18 NOTE — CARDIO/PULMONARY
DISCHARGE SUMMARY NOTE  2024      NAME: Aracelis Awan : 1965 AGE: 58 y.o.  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]    REFERRING PHYSICIAN: Maikel Juarez MD    MEDICAL HX:  Past Medical History:   Diagnosis Date    Arrhythmia 2012    Eczema 3/16/2012    Hypertension 3/16/2012    Menopause 2015    Pituitary adenoma (HCC) 2012    Viral meningitis 3/16/2012       LABS:     No results found for: \"HBA1C\", \"[id number]\"  Lab Results   Component Value Date/Time    CHOL 220 2020 08:51 AM    HDL 97 2020 08:51 AM     2020 08:51 AM    VLDL 15 2020 08:51 AM         ANTHROPOMETRICS:      Ht Readings from Last 1 Encounters:   24 1.524 m (5')      Wt Readings from Last 1 Encounters:   24 48.6 kg (107 lb 3.2 oz)        WAIST: 26.5         PROGRAM SUMMARY:    Aracelis Awan completed 34 sessions in phase II of the Cardiac Rehab program. Aracelis Awan plans to continue exercising at home and/or a gym and has set revised goals that include 30 minutes of moderate-intensity aerobic activity 3-5 days weekly.      MET level increased from 1.8 at intake to 4.9 at discharge. Weight lost was .2 lbs. and .5 inches from their waist.  Aracelis Awan will focus on diet and nutrition at home and has received individualized healthy nutrition recommendations from [facility] Cardiac Rehab staff.  They are aware of their cardiac disease risk factors and cardiac medications. All questions were addressed and discharge plans discussed. Aracelis Awan verbalized understanding.      Berna Loving, MAGO  2024

## 2024-07-23 ENCOUNTER — APPOINTMENT (OUTPATIENT)
Facility: HOSPITAL | Age: 59
End: 2024-07-23
Attending: INTERNAL MEDICINE
Payer: COMMERCIAL

## 2024-07-25 ENCOUNTER — APPOINTMENT (OUTPATIENT)
Facility: HOSPITAL | Age: 59
End: 2024-07-25
Attending: INTERNAL MEDICINE
Payer: COMMERCIAL